# Patient Record
(demographics unavailable — no encounter records)

---

## 2020-07-20 NOTE — NUR
patient arrives to er with recent discharge of blood and she is roughly 6 weeks 
pregnant.  cramping.  lower back pain.  para 3/  1.  lmp 6/5